# Patient Record
Sex: FEMALE | Race: BLACK OR AFRICAN AMERICAN | NOT HISPANIC OR LATINO
[De-identification: names, ages, dates, MRNs, and addresses within clinical notes are randomized per-mention and may not be internally consistent; named-entity substitution may affect disease eponyms.]

---

## 2017-02-10 ENCOUNTER — RX RENEWAL (OUTPATIENT)
Age: 82
End: 2017-02-10

## 2017-06-05 ENCOUNTER — APPOINTMENT (OUTPATIENT)
Dept: CARDIOLOGY | Facility: CLINIC | Age: 82
End: 2017-06-05

## 2017-06-05 ENCOUNTER — NON-APPOINTMENT (OUTPATIENT)
Age: 82
End: 2017-06-05

## 2017-06-05 VITALS
OXYGEN SATURATION: 100 % | WEIGHT: 163 LBS | HEART RATE: 80 BPM | BODY MASS INDEX: 24.71 KG/M2 | HEIGHT: 68 IN | DIASTOLIC BLOOD PRESSURE: 68 MMHG | SYSTOLIC BLOOD PRESSURE: 110 MMHG

## 2017-08-07 ENCOUNTER — NON-APPOINTMENT (OUTPATIENT)
Age: 82
End: 2017-08-07

## 2017-08-07 ENCOUNTER — APPOINTMENT (OUTPATIENT)
Dept: CARDIOLOGY | Facility: CLINIC | Age: 82
End: 2017-08-07
Payer: MEDICARE

## 2017-08-07 VITALS
WEIGHT: 163 LBS | SYSTOLIC BLOOD PRESSURE: 114 MMHG | BODY MASS INDEX: 24.71 KG/M2 | HEART RATE: 80 BPM | HEIGHT: 68 IN | DIASTOLIC BLOOD PRESSURE: 71 MMHG | OXYGEN SATURATION: 99 %

## 2017-08-07 PROCEDURE — 99215 OFFICE O/P EST HI 40 MIN: CPT

## 2017-08-07 PROCEDURE — 93000 ELECTROCARDIOGRAM COMPLETE: CPT

## 2017-12-04 ENCOUNTER — NON-APPOINTMENT (OUTPATIENT)
Age: 82
End: 2017-12-04

## 2017-12-04 ENCOUNTER — APPOINTMENT (OUTPATIENT)
Dept: CARDIOLOGY | Facility: CLINIC | Age: 82
End: 2017-12-04
Payer: MEDICARE

## 2017-12-04 VITALS — HEART RATE: 96 BPM | DIASTOLIC BLOOD PRESSURE: 78 MMHG | SYSTOLIC BLOOD PRESSURE: 168 MMHG

## 2017-12-04 VITALS — DIASTOLIC BLOOD PRESSURE: 84 MMHG | SYSTOLIC BLOOD PRESSURE: 139 MMHG

## 2017-12-04 VITALS — SYSTOLIC BLOOD PRESSURE: 149 MMHG | DIASTOLIC BLOOD PRESSURE: 82 MMHG

## 2017-12-04 PROCEDURE — 99215 OFFICE O/P EST HI 40 MIN: CPT

## 2017-12-04 PROCEDURE — 93000 ELECTROCARDIOGRAM COMPLETE: CPT

## 2018-03-12 ENCOUNTER — RX RENEWAL (OUTPATIENT)
Age: 83
End: 2018-03-12

## 2018-04-18 ENCOUNTER — NON-APPOINTMENT (OUTPATIENT)
Age: 83
End: 2018-04-18

## 2018-04-18 ENCOUNTER — APPOINTMENT (OUTPATIENT)
Dept: CARDIOLOGY | Facility: CLINIC | Age: 83
End: 2018-04-18
Payer: MEDICARE

## 2018-04-18 VITALS — DIASTOLIC BLOOD PRESSURE: 77 MMHG | SYSTOLIC BLOOD PRESSURE: 151 MMHG

## 2018-04-18 VITALS
HEIGHT: 68 IN | BODY MASS INDEX: 24.55 KG/M2 | HEART RATE: 80 BPM | WEIGHT: 162 LBS | SYSTOLIC BLOOD PRESSURE: 155 MMHG | DIASTOLIC BLOOD PRESSURE: 80 MMHG | OXYGEN SATURATION: 97 %

## 2018-04-18 PROCEDURE — 93000 ELECTROCARDIOGRAM COMPLETE: CPT

## 2018-04-18 PROCEDURE — 99215 OFFICE O/P EST HI 40 MIN: CPT

## 2018-09-09 ENCOUNTER — RX RENEWAL (OUTPATIENT)
Age: 83
End: 2018-09-09

## 2018-09-10 ENCOUNTER — MEDICATION RENEWAL (OUTPATIENT)
Age: 83
End: 2018-09-10

## 2018-09-26 ENCOUNTER — APPOINTMENT (OUTPATIENT)
Dept: CARDIOLOGY | Facility: CLINIC | Age: 83
End: 2018-09-26
Payer: MEDICARE

## 2018-09-26 ENCOUNTER — NON-APPOINTMENT (OUTPATIENT)
Age: 83
End: 2018-09-26

## 2018-09-26 VITALS
DIASTOLIC BLOOD PRESSURE: 86 MMHG | HEART RATE: 80 BPM | OXYGEN SATURATION: 98 % | WEIGHT: 157 LBS | BODY MASS INDEX: 23.79 KG/M2 | SYSTOLIC BLOOD PRESSURE: 144 MMHG | HEIGHT: 68 IN

## 2018-09-26 VITALS — DIASTOLIC BLOOD PRESSURE: 90 MMHG | SYSTOLIC BLOOD PRESSURE: 161 MMHG

## 2018-09-26 PROCEDURE — 93000 ELECTROCARDIOGRAM COMPLETE: CPT

## 2018-09-26 PROCEDURE — 99214 OFFICE O/P EST MOD 30 MIN: CPT

## 2018-11-13 ENCOUNTER — APPOINTMENT (OUTPATIENT)
Dept: OBGYN | Facility: CLINIC | Age: 83
End: 2018-11-13
Payer: MEDICARE

## 2018-11-13 VITALS
SYSTOLIC BLOOD PRESSURE: 150 MMHG | WEIGHT: 152 LBS | BODY MASS INDEX: 23.04 KG/M2 | HEIGHT: 68 IN | DIASTOLIC BLOOD PRESSURE: 88 MMHG

## 2018-11-13 PROCEDURE — 99203 OFFICE O/P NEW LOW 30 MIN: CPT

## 2018-11-21 ENCOUNTER — MESSAGE (OUTPATIENT)
Age: 83
End: 2018-11-21

## 2018-11-29 ENCOUNTER — APPOINTMENT (OUTPATIENT)
Dept: OBGYN | Facility: CLINIC | Age: 83
End: 2018-11-29
Payer: MEDICARE

## 2018-11-29 DIAGNOSIS — N95.0 POSTMENOPAUSAL BLEEDING: ICD-10-CM

## 2018-11-29 PROCEDURE — 58563Z: CUSTOM

## 2018-12-04 ENCOUNTER — MESSAGE (OUTPATIENT)
Age: 83
End: 2018-12-04

## 2018-12-04 DIAGNOSIS — N71.9 INFLAMMATORY DISEASE OF UTERUS, UNSPECIFIED: ICD-10-CM

## 2018-12-04 LAB — CORE LAB BIOPSY: NORMAL

## 2018-12-19 ENCOUNTER — OUTPATIENT (OUTPATIENT)
Dept: OUTPATIENT SERVICES | Facility: HOSPITAL | Age: 83
LOS: 1 days | End: 2018-12-19

## 2018-12-19 ENCOUNTER — APPOINTMENT (OUTPATIENT)
Dept: CV DIAGNOSITCS | Facility: HOSPITAL | Age: 83
End: 2018-12-19
Payer: MEDICARE

## 2018-12-19 DIAGNOSIS — I36.9 NONRHEUMATIC TRICUSPID VALVE DISORDER, UNSPECIFIED: ICD-10-CM

## 2018-12-19 DIAGNOSIS — I05.9 RHEUMATIC MITRAL VALVE DISEASE, UNSPECIFIED: ICD-10-CM

## 2018-12-19 PROCEDURE — 93306 TTE W/DOPPLER COMPLETE: CPT | Mod: 26

## 2019-01-02 ENCOUNTER — APPOINTMENT (OUTPATIENT)
Dept: CARDIOLOGY | Facility: CLINIC | Age: 84
End: 2019-01-02

## 2019-01-16 ENCOUNTER — APPOINTMENT (OUTPATIENT)
Dept: CARDIOLOGY | Facility: CLINIC | Age: 84
End: 2019-01-16
Payer: MEDICARE

## 2019-01-16 ENCOUNTER — NON-APPOINTMENT (OUTPATIENT)
Age: 84
End: 2019-01-16

## 2019-01-16 VITALS
OXYGEN SATURATION: 98 % | DIASTOLIC BLOOD PRESSURE: 82 MMHG | HEART RATE: 81 BPM | WEIGHT: 150 LBS | SYSTOLIC BLOOD PRESSURE: 148 MMHG | BODY MASS INDEX: 22.73 KG/M2 | HEIGHT: 68 IN | RESPIRATION RATE: 14 BRPM

## 2019-01-16 VITALS — DIASTOLIC BLOOD PRESSURE: 80 MMHG | SYSTOLIC BLOOD PRESSURE: 150 MMHG

## 2019-01-16 PROCEDURE — 93000 ELECTROCARDIOGRAM COMPLETE: CPT

## 2019-01-16 PROCEDURE — 99214 OFFICE O/P EST MOD 30 MIN: CPT

## 2019-01-16 RX ORDER — DOXYCYCLINE HYCLATE 100 MG/1
100 TABLET ORAL TWICE DAILY
Qty: 14 | Refills: 0 | Status: DISCONTINUED | COMMUNITY
Start: 2018-12-04 | End: 2019-01-16

## 2019-01-16 NOTE — REASON FOR VISIT
[Follow-Up - Clinic] : a clinic follow-up of [Atrial Fibrillation] : atrial fibrillation [FreeTextEntry1] : tricuspid and mitral disease

## 2019-01-16 NOTE — HISTORY OF PRESENT ILLNESS
[FreeTextEntry1] : anemia. atrial fibrillation remote pacemaker.  hasn’t required a transfusion in a while (3/16)..\par has been a little more dyspneic but not using inhaler as prescribed.\par says some mornings she wakes up with a rapid heart beat. pacemaker changed and winthrop and monitored at [facility].\par \par no acute problems at this time.  chronic anemia requires periodic blood transfusions.\par \par 6/5  no active complaints.. required another transfusions in March..\par \par 12/4/2017  daughter states much better since not taking aspirin.  last transfusion is 7/7/2017.  most recent hgb 11.9 in november. daughters only concern is confusion.\par \par \par 8/7  continues to require transfusions.  two recently.  although patient has not shown evidence of bleeding the patient and daughter do not feel confident taking the aspirin so she stopped a month ago.  she says she feels better since then.  a bone marrow bx is being considered.\par \par 4/18/2018 no recent transfusions since aspirin was stopped.  no dyspnea.  she stopped lasix on her own about 1 month ago.  \par blood work  hgb 11.4/35 plt 159, wbc 4.1\par creat 1.0, nml lfts, \par feels better overall.\par \par 9/26/2018  doing very well overall.  has not required further blood transfusions. is using lasix on prn basis.denies dyspnea or edema.\par \par 1/16/2019  had problem with uterine bleed.  no chest pain. dyspnea or edema.  echo reviewed and appears stable.

## 2019-01-16 NOTE — PHYSICAL EXAM
[General Appearance - Well Developed] : well developed [Normal Appearance] : normal appearance [Well Groomed] : well groomed [General Appearance - Well Nourished] : well nourished [No Deformities] : no deformities [General Appearance - In No Acute Distress] : no acute distress [Normal Conjunctiva] : the conjunctiva exhibited no abnormalities [Eyelids - No Xanthelasma] : the eyelids demonstrated no xanthelasmas [Normal Oral Mucosa] : normal oral mucosa [No Oral Pallor] : no oral pallor [No Oral Cyanosis] : no oral cyanosis [Normal Jugular Venous A Waves Present] : normal jugular venous A waves present [Normal Jugular Venous V Waves Present] : normal jugular venous V waves present [No Jugular Venous Mei A Waves] : no jugular venous mei A waves [Respiration, Rhythm And Depth] : normal respiratory rhythm and effort [Exaggerated Use Of Accessory Muscles For Inspiration] : no accessory muscle use [Auscultation Breath Sounds / Voice Sounds] : lungs were clear to auscultation bilaterally [Normal] : normal [Normal Rate] : normal [Rhythm Regular] : regular [Normal S1] : normal S1 [Normal S2] : normal S2 [No Murmur] : no murmurs heard [Right Carotid Bruit] : no bruit heard over the right carotid [Left Carotid Bruit] : no bruit heard over the left carotid [___+] : [unfilled]U+ pretibial pitting edema on the left [Rt] : no varicose veins of the right leg [Lt] : no varicose veins of the left leg [Abdomen Soft] : soft [Abdomen Tenderness] : non-tender [Abdomen Mass (___ Cm)] : no abdominal mass palpated [Abnormal Walk] : normal gait [Gait - Sufficient For Exercise Testing] : the gait was sufficient for exercise testing [Nail Clubbing] : no clubbing of the fingernails [Cyanosis, Localized] : no localized cyanosis [Petechial Hemorrhages (___cm)] : no petechial hemorrhages [Skin Color & Pigmentation] : normal skin color and pigmentation [] : no rash [No Venous Stasis] : no venous stasis [Skin Lesions] : no skin lesions [No Skin Ulcers] : no skin ulcer [No Xanthoma] : no  xanthoma was observed [Oriented To Time, Place, And Person] : oriented to person, place, and time [Affect] : the affect was normal [Mood] : the mood was normal [No Anxiety] : not feeling anxious

## 2019-01-16 NOTE — DISCUSSION/SUMMARY
[Essential Hypertension] : essential hypertension [Not Responding to Treatment] : not responding to treatment [None] : none [Sodium Restriction] : sodium restriction [Patient] : the patient [Family] : the patient's family [___ Month(s)] : [unfilled] month(s) [FreeTextEntry4] : s/p mitral and tricuspid repair [FreeTextEntry1] : 85 year old with remote mitral surgery, afib, chronic severe anemia requiring transfusion.  has been taken off anticoagulation..  recent ppm replacement at [place].  no active or acute cardio vasc complaints..\par \par 12/4/2017  doing better overall./  mild lower extremity edema.  recommended few dose of low dose diuretic.\par \par 4/18/2018  no active sx.  self stopped lasix.  told her to keep track of edema and use on prn basis.  cannot tolerate aspirin, but no blood transfusions.   states does not need refills.  has maintained nsr.\par \par 9/26/2018  voices no complaints.  no longer transfusion dependant.  not able to tolerate anti coagulation.  BP mildly elevated increase metoprolol to 75 bid.\par \par 1/16/2019  swelling front and side of left shin.calve.  has been that way for a least a month.  obtain Doppler r/o dvtt.  bp still elevated discussed diet and restriction from salt.  echo reviewed.  mild to node TR with mild pulm htn. maintain diuretic,.\par \par

## 2019-03-22 ENCOUNTER — TRANSCRIPTION ENCOUNTER (OUTPATIENT)
Age: 84
End: 2019-03-22

## 2019-04-15 ENCOUNTER — NON-APPOINTMENT (OUTPATIENT)
Age: 84
End: 2019-04-15

## 2019-04-15 ENCOUNTER — APPOINTMENT (OUTPATIENT)
Dept: CARDIOLOGY | Facility: CLINIC | Age: 84
End: 2019-04-15
Payer: MEDICARE

## 2019-04-15 VITALS — DIASTOLIC BLOOD PRESSURE: 74 MMHG | SYSTOLIC BLOOD PRESSURE: 125 MMHG | HEART RATE: 76 BPM | OXYGEN SATURATION: 96 %

## 2019-04-15 PROCEDURE — 99214 OFFICE O/P EST MOD 30 MIN: CPT

## 2019-04-15 PROCEDURE — 93000 ELECTROCARDIOGRAM COMPLETE: CPT

## 2019-04-15 NOTE — PHYSICAL EXAM
[General Appearance - Well Developed] : well developed [Normal Appearance] : normal appearance [Well Groomed] : well groomed [General Appearance - Well Nourished] : well nourished [No Deformities] : no deformities [General Appearance - In No Acute Distress] : no acute distress [Normal Conjunctiva] : the conjunctiva exhibited no abnormalities [Eyelids - No Xanthelasma] : the eyelids demonstrated no xanthelasmas [Normal Oral Mucosa] : normal oral mucosa [No Oral Pallor] : no oral pallor [No Oral Cyanosis] : no oral cyanosis [Normal Jugular Venous A Waves Present] : normal jugular venous A waves present [Normal Jugular Venous V Waves Present] : normal jugular venous V waves present [No Jugular Venous Mei A Waves] : no jugular venous mei A waves [Respiration, Rhythm And Depth] : normal respiratory rhythm and effort [Exaggerated Use Of Accessory Muscles For Inspiration] : no accessory muscle use [Auscultation Breath Sounds / Voice Sounds] : lungs were clear to auscultation bilaterally [Normal] : normal [Normal Rate] : normal [Rhythm Regular] : regular [Normal S1] : normal S1 [Normal S2] : normal S2 [No Murmur] : no murmurs heard [Right Carotid Bruit] : no bruit heard over the right carotid [Left Carotid Bruit] : no bruit heard over the left carotid [___ +] : bilateral [unfilled]U+ pitting edema to the ankles [Rt] : no varicose veins of the right leg [Lt] : no varicose veins of the left leg [Abdomen Soft] : soft [Abdomen Tenderness] : non-tender [Abdomen Mass (___ Cm)] : no abdominal mass palpated [Abnormal Walk] : normal gait [Gait - Sufficient For Exercise Testing] : the gait was sufficient for exercise testing [Nail Clubbing] : no clubbing of the fingernails [Cyanosis, Localized] : no localized cyanosis [Petechial Hemorrhages (___cm)] : no petechial hemorrhages [Skin Color & Pigmentation] : normal skin color and pigmentation [] : no rash [No Venous Stasis] : no venous stasis [Skin Lesions] : no skin lesions [No Skin Ulcers] : no skin ulcer [No Xanthoma] : no  xanthoma was observed [Oriented To Time, Place, And Person] : oriented to person, place, and time [Affect] : the affect was normal [Mood] : the mood was normal [No Anxiety] : not feeling anxious

## 2019-04-15 NOTE — REASON FOR VISIT
[Follow-Up - Clinic] : a clinic follow-up of [Atrial Fibrillation] : atrial fibrillation [Hypertension] : hypertension [Family Member] : family member

## 2019-04-15 NOTE — HISTORY OF PRESENT ILLNESS
[FreeTextEntry1] : anemia. atrial fibrillation remote pacemaker.  hasn’t required a transfusion in a while (3/16)..\par has been a little more dyspneic but not using inhaler as prescribed.\par says some mornings she wakes up with a rapid heart beat. pacemaker changed and winthrop and monitored at [facility].\par \par no acute problems at this time.  chronic anemia requires periodic blood transfusions.\par \par 6/5  no active complaints.. required another transfusions in March..\par \par 12/4/2017  daughter states much better since not taking aspirin.  last transfusion is 7/7/2017.  most recent hgb 11.9 in november. daughters only concern is confusion.\par \par \par 8/7  continues to require transfusions.  two recently.  although patient has not shown evidence of bleeding the patient and daughter do not feel confident taking the aspirin so she stopped a month ago.  she says she feels better since then.  a bone marrow bx is being considered.\par \par 4/18/2018 no recent transfusions since aspirin was stopped.  no dyspnea.  she stopped lasix on her own about 1 month ago.  \par blood work  hgb 11.4/35 plt 159, wbc 4.1\par creat 1.0, nml lfts, \par feels better overall.\par \par 9/26/2018  doing very well overall.  has not required further blood transfusions. is using lasix on prn basis.denies dyspnea or edema.\par \par 1/16/2019  had problem with uterine bleed.  no chest pain. dyspnea or edema.  echo reviewed and appears stable.\par \par 4/15/2019  no new issues or complaints.  stopped  using diuretic because of incontinence.

## 2019-04-15 NOTE — DISCUSSION/SUMMARY
[Hyperlipidemia] : hyperlipidemia [Stable] : stable [Essential Hypertension] : essential hypertension [Improving] : improving [___ Month(s)] : [unfilled] month(s) [FreeTextEntry1] : 85 year old with remote mitral surgery, afib, chronic severe anemia requiring transfusion.  has been taken off anticoagulation..  recent ppm replacement at [place].  no active or acute cardio vasc complaints..\par \par 12/4/2017  doing better overall./  mild lower extremity edema.  recommended few dose of low dose diuretic.\par \par 4/18/2018  no active sx.  self stopped lasix.  told her to keep track of edema and use on prn basis.  cannot tolerate aspirin, but no blood transfusions.   states does not need refills.  has maintained nsr.\par \par 9/26/2018  voices no complaints.  no longer transfusion dependant.  not able to tolerate anti coagulation.  BP mildly elevated increase metoprolol to 75 bid.\par \par 1/16/2019  swelling front and side of left shin.calve.  has been that way for a least a month.  obtain Doppler r/o dvtt.  bp still elevated discussed diet and restriction from salt.  echo reviewed.  mild to node TR with mild pulm htn. maintain diuretic,.\par \par 4/15/2019  negotiated taking Lasix every two or three days for edema.  BP is well controlled.  no DVT on Doppler scan.\par \par

## 2019-08-05 ENCOUNTER — NON-APPOINTMENT (OUTPATIENT)
Age: 84
End: 2019-08-05

## 2019-08-05 ENCOUNTER — APPOINTMENT (OUTPATIENT)
Dept: CARDIOLOGY | Facility: CLINIC | Age: 84
End: 2019-08-05
Payer: MEDICARE

## 2019-08-05 VITALS — SYSTOLIC BLOOD PRESSURE: 126 MMHG | DIASTOLIC BLOOD PRESSURE: 78 MMHG

## 2019-08-05 VITALS
SYSTOLIC BLOOD PRESSURE: 138 MMHG | HEIGHT: 68 IN | HEART RATE: 89 BPM | WEIGHT: 152 LBS | OXYGEN SATURATION: 98 % | BODY MASS INDEX: 23.04 KG/M2 | DIASTOLIC BLOOD PRESSURE: 84 MMHG

## 2019-08-05 PROCEDURE — 99214 OFFICE O/P EST MOD 30 MIN: CPT

## 2019-08-05 PROCEDURE — 93000 ELECTROCARDIOGRAM COMPLETE: CPT

## 2019-08-05 NOTE — HISTORY OF PRESENT ILLNESS
[FreeTextEntry1] : anemia. atrial fibrillation remote pacemaker.  hasn’t required a transfusion in a while (3/16)..\par has been a little more dyspneic but not using inhaler as prescribed.\par says some mornings she wakes up with a rapid heart beat. pacemaker changed and winthrop and monitored at [facility].\par \par no acute problems at this time.  chronic anemia requires periodic blood transfusions.\par \par 6/5  no active complaints.. required another transfusions in March..\par \par 12/4/2017  daughter states much better since not taking aspirin.  last transfusion is 7/7/2017.  most recent hgb 11.9 in november. daughters only concern is confusion.\par \par \par 8/7  continues to require transfusions.  two recently.  although patient has not shown evidence of bleeding the patient and daughter do not feel confident taking the aspirin so she stopped a month ago.  she says she feels better since then.  a bone marrow bx is being considered.\par \par 4/18/2018 no recent transfusions since aspirin was stopped.  no dyspnea.  she stopped lasix on her own about 1 month ago.  \par blood work  hgb 11.4/35 plt 159, wbc 4.1\par creat 1.0, nml lfts, \par feels better overall.\par \par 9/26/2018  doing very well overall.  has not required further blood transfusions. is using lasix on prn basis.denies dyspnea or edema.\par \par 1/16/2019  had problem with uterine bleed.  no chest pain. dyspnea or edema.  echo reviewed and appears stable.\par \par 4/15/2019  no new issues or complaints.  stopped  using diuretic because of incontinence.\par \par 8/5/2019  pacemaker check was good.  no chest pain,  no water retention.  progressive memory.  eating well.

## 2019-08-05 NOTE — DISCUSSION/SUMMARY
[Atrial Fibrillation] : atrial fibrillation [Stable] : stable [Essential Hypertension] : essential hypertension [___ Month(s)] : [unfilled] month(s) [FreeTextEntry4] : mvr//tvr [FreeTextEntry1] : 85 year old with remote mitral surgery, afib, chronic severe anemia requiring transfusion.  has been taken off anticoagulation..  recent ppm replacement at [place].  no active or acute cardio vasc complaints..\par \par 12/4/2017  doing better overall./  mild lower extremity edema.  recommended few dose of low dose diuretic.\par \par 4/18/2018  no active sx.  self stopped lasix.  told her to keep track of edema and use on prn basis.  cannot tolerate aspirin, but no blood transfusions.   states does not need refills.  has maintained nsr.\par \par 9/26/2018  voices no complaints.  no longer transfusion dependant.  not able to tolerate anti coagulation.  BP mildly elevated increase metoprolol to 75 bid.\par \par 1/16/2019  swelling front and side of left shin.calve.  has been that way for a least a month.  obtain Doppler r/o dvtt.  bp still elevated discussed diet and restriction from salt.  echo reviewed.  mild to node TR with mild pulm htn. maintain diuretic,.\par \par 8/5/2019  no acute sx.. bp improved and no peripheral edema..  maintain current med tx.\par \par

## 2019-08-05 NOTE — REASON FOR VISIT
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

## 2019-08-05 NOTE — PHYSICAL EXAM
[General Appearance - Well Developed] : well developed [Normal Appearance] : normal appearance [Well Groomed] : well groomed [General Appearance - Well Nourished] : well nourished [No Deformities] : no deformities [General Appearance - In No Acute Distress] : no acute distress [Eyelids - No Xanthelasma] : the eyelids demonstrated no xanthelasmas [Normal Conjunctiva] : the conjunctiva exhibited no abnormalities [Normal Oral Mucosa] : normal oral mucosa [No Oral Pallor] : no oral pallor [No Oral Cyanosis] : no oral cyanosis [Normal Jugular Venous V Waves Present] : normal jugular venous V waves present [Normal Jugular Venous A Waves Present] : normal jugular venous A waves present [No Jugular Venous Mei A Waves] : no jugular venous mei A waves [Respiration, Rhythm And Depth] : normal respiratory rhythm and effort [Auscultation Breath Sounds / Voice Sounds] : lungs were clear to auscultation bilaterally [Exaggerated Use Of Accessory Muscles For Inspiration] : no accessory muscle use [Normal Rate] : normal [Normal] : normal [Rhythm Regular] : regular [Normal S2] : normal S2 [No Murmur] : no murmurs heard [Right Carotid Bruit] : no bruit heard over the right carotid [Left Carotid Bruit] : no bruit heard over the left carotid [No Abnormalities] : the abdominal aorta was not enlarged and no bruit was heard [Bruit] : no bruit heard [No Pitting Edema] : no pitting edema present [Abdomen Soft] : soft [Abdomen Tenderness] : non-tender [Abdomen Mass (___ Cm)] : no abdominal mass palpated [Gait - Sufficient For Exercise Testing] : the gait was sufficient for exercise testing [Abnormal Walk] : normal gait [Nail Clubbing] : no clubbing of the fingernails [Cyanosis, Localized] : no localized cyanosis [Petechial Hemorrhages (___cm)] : no petechial hemorrhages [Skin Color & Pigmentation] : normal skin color and pigmentation [] : no rash [No Venous Stasis] : no venous stasis [Skin Lesions] : no skin lesions [No Skin Ulcers] : no skin ulcer [No Xanthoma] : no  xanthoma was observed [Oriented To Time, Place, And Person] : oriented to person, place, and time [Affect] : the affect was normal [Mood] : the mood was normal [No Anxiety] : not feeling anxious

## 2019-09-23 ENCOUNTER — RX RENEWAL (OUTPATIENT)
Age: 84
End: 2019-09-23

## 2019-10-06 ENCOUNTER — RX RENEWAL (OUTPATIENT)
Age: 84
End: 2019-10-06

## 2019-11-25 ENCOUNTER — RX RENEWAL (OUTPATIENT)
Age: 84
End: 2019-11-25

## 2019-12-09 ENCOUNTER — RX RENEWAL (OUTPATIENT)
Age: 84
End: 2019-12-09

## 2019-12-18 ENCOUNTER — APPOINTMENT (OUTPATIENT)
Dept: CARDIOLOGY | Facility: CLINIC | Age: 84
End: 2019-12-18
Payer: MEDICARE

## 2019-12-18 ENCOUNTER — NON-APPOINTMENT (OUTPATIENT)
Age: 84
End: 2019-12-18

## 2019-12-18 VITALS
HEART RATE: 81 BPM | BODY MASS INDEX: 22.88 KG/M2 | OXYGEN SATURATION: 96 % | DIASTOLIC BLOOD PRESSURE: 71 MMHG | HEIGHT: 68 IN | WEIGHT: 151 LBS | SYSTOLIC BLOOD PRESSURE: 123 MMHG

## 2019-12-18 PROCEDURE — 93000 ELECTROCARDIOGRAM COMPLETE: CPT

## 2019-12-18 PROCEDURE — 99214 OFFICE O/P EST MOD 30 MIN: CPT

## 2019-12-18 NOTE — HISTORY OF PRESENT ILLNESS
[FreeTextEntry1] : anemia. atrial fibrillation remote pacemaker.  hasn’t required a transfusion in a while (3/16)..\par has been a little more dyspneic but not using inhaler as prescribed.\par says some mornings she wakes up with a rapid heart beat. pacemaker changed and winthrop and monitored at [facility].\par \par no acute problems at this time.  chronic anemia requires periodic blood transfusions.\par \par 6/5  no active complaints.. required another transfusions in March..\par \par 12/4/2017  daughter states much better since not taking aspirin.  last transfusion is 7/7/2017.  most recent hgb 11.9 in november. daughters only concern is confusion.\par \par \par 8/7  continues to require transfusions.  two recently.  although patient has not shown evidence of bleeding the patient and daughter do not feel confident taking the aspirin so she stopped a month ago.  she says she feels better since then.  a bone marrow bx is being considered.\par \par 4/18/2018 no recent transfusions since aspirin was stopped.  no dyspnea.  she stopped lasix on her own about 1 month ago.  \par blood work  hgb 11.4/35 plt 159, wbc 4.1\par creat 1.0, nml lfts, \par feels better overall.\par \par 9/26/2018  doing very well overall.  has not required further blood transfusions. is using lasix on prn basis.denies dyspnea or edema.\par \par 1/16/2019  had problem with uterine bleed.  no chest pain. dyspnea or edema.  echo reviewed and appears stable.\par \par 4/15/2019  no new issues or complaints.  stopped  using diuretic because of incontinence.\par \par 8/5/2019  pacemaker check was good.  no chest pain,  no water retention.  progressive memory.  eating well.\par \par 12/18/2019  complains of frequent urination.  not on a diuretic. daughter says not diabetic.  no edema. no dyspnea,.

## 2019-12-18 NOTE — DISCUSSION/SUMMARY
[Atrial Fibrillation] : atrial fibrillation [Stable] : stable [Essential Hypertension] : essential hypertension [Improving] : improving [None] : none [___ Month(s)] : [unfilled] month(s) [FreeTextEntry1] : 85 year old with remote mitral surgery, afib, chronic severe anemia requiring transfusion.  has been taken off anticoagulation..  recent ppm replacement at [place].  no active or acute cardio vasc complaints..\par \par 12/4/2017  doing better overall./  mild lower extremity edema.  recommended few dose of low dose diuretic.\par \par 4/18/2018  no active sx.  self stopped lasix.  told her to keep track of edema and use on prn basis.  cannot tolerate aspirin, but no blood transfusions.   states does not need refills.  has maintained nsr.\par \par 9/26/2018  voices no complaints.  no longer transfusion dependant.  not able to tolerate anti coagulation.  BP mildly elevated increase metoprolol to 75 bid.\par \par 1/16/2019  swelling front and side of left shin.calve.  has been that way for a least a month.  obtain Doppler r/o dvtt.  bp still elevated discussed diet and restriction from salt.  echo reviewed.  mild to node TR with mild pulm htn. maintain diuretic,.\par \par 8/5/2019  no acute sx.. bp improved and no peripheral edema..  maintain current med tx.\par \par 12/18/2019  no edema. not on a diuretic, compllains of excessive urination.  suggest they see PCP to r/o UTI, urinary retention.\par \par

## 2019-12-18 NOTE — PHYSICAL EXAM
[General Appearance - Well Developed] : well developed [Normal Appearance] : normal appearance [Well Groomed] : well groomed [General Appearance - Well Nourished] : well nourished [No Deformities] : no deformities [General Appearance - In No Acute Distress] : no acute distress [Eyelids - No Xanthelasma] : the eyelids demonstrated no xanthelasmas [Normal Conjunctiva] : the conjunctiva exhibited no abnormalities [Normal Oral Mucosa] : normal oral mucosa [No Oral Pallor] : no oral pallor [No Oral Cyanosis] : no oral cyanosis [Normal Jugular Venous A Waves Present] : normal jugular venous A waves present [Normal Jugular Venous V Waves Present] : normal jugular venous V waves present [No Jugular Venous Mei A Waves] : no jugular venous mei A waves [Respiration, Rhythm And Depth] : normal respiratory rhythm and effort [Exaggerated Use Of Accessory Muscles For Inspiration] : no accessory muscle use [Auscultation Breath Sounds / Voice Sounds] : lungs were clear to auscultation bilaterally [Normal] : normal [Normal Rate] : normal [Normal S1] : normal S1 [Rhythm Regular] : regular [Normal S2] : normal S2 [Left Carotid Bruit] : no bruit heard over the left carotid [No Murmur] : no murmurs heard [Right Carotid Bruit] : no bruit heard over the right carotid [No Abnormalities] : the abdominal aorta was not enlarged and no bruit was heard [Bruit] : no bruit heard [No Pitting Edema] : no pitting edema present [Rt] : no varicose veins of the right leg [Lt] : no varicose veins of the left leg [Abdomen Soft] : soft [Abdomen Tenderness] : non-tender [Abdomen Mass (___ Cm)] : no abdominal mass palpated [Abnormal Walk] : normal gait [Gait - Sufficient For Exercise Testing] : the gait was sufficient for exercise testing [Nail Clubbing] : no clubbing of the fingernails [Cyanosis, Localized] : no localized cyanosis [Petechial Hemorrhages (___cm)] : no petechial hemorrhages [] : no rash [Skin Color & Pigmentation] : normal skin color and pigmentation [No Venous Stasis] : no venous stasis [Skin Lesions] : no skin lesions [No Skin Ulcers] : no skin ulcer [No Xanthoma] : no  xanthoma was observed [Oriented To Time, Place, And Person] : oriented to person, place, and time [Affect] : the affect was normal [Mood] : the mood was normal [No Anxiety] : not feeling anxious

## 2019-12-18 NOTE — REASON FOR VISIT
[Follow-Up - Clinic] : a clinic follow-up of [FreeTextEntry1] : tricuspid  valve disease [Atrial Fibrillation] : atrial fibrillation [Family Member] : family member

## 2020-06-17 ENCOUNTER — NON-APPOINTMENT (OUTPATIENT)
Age: 85
End: 2020-06-17

## 2020-06-17 ENCOUNTER — APPOINTMENT (OUTPATIENT)
Dept: CARDIOLOGY | Facility: CLINIC | Age: 85
End: 2020-06-17
Payer: MEDICARE

## 2020-06-17 VITALS
WEIGHT: 155 LBS | BODY MASS INDEX: 24.33 KG/M2 | SYSTOLIC BLOOD PRESSURE: 148 MMHG | HEIGHT: 67 IN | DIASTOLIC BLOOD PRESSURE: 81 MMHG | OXYGEN SATURATION: 97 % | RESPIRATION RATE: 18 BRPM | TEMPERATURE: 98 F | HEART RATE: 80 BPM

## 2020-06-17 PROCEDURE — 93000 ELECTROCARDIOGRAM COMPLETE: CPT

## 2020-06-17 PROCEDURE — 99214 OFFICE O/P EST MOD 30 MIN: CPT

## 2020-06-17 NOTE — REASON FOR VISIT
[Follow-Up - Clinic] : a clinic follow-up of [Atrial Fibrillation] : atrial fibrillation [Hyperlipidemia] : hyperlipidemia [Hypertension] : hypertension [Family Member] : family member

## 2020-06-17 NOTE — PHYSICAL EXAM
[General Appearance - Well Developed] : well developed [Well Groomed] : well groomed [Normal Appearance] : normal appearance [General Appearance - Well Nourished] : well nourished [No Deformities] : no deformities [General Appearance - In No Acute Distress] : no acute distress [Normal Conjunctiva] : the conjunctiva exhibited no abnormalities [Eyelids - No Xanthelasma] : the eyelids demonstrated no xanthelasmas [No Oral Pallor] : no oral pallor [Normal Oral Mucosa] : normal oral mucosa [No Oral Cyanosis] : no oral cyanosis [Normal Jugular Venous A Waves Present] : normal jugular venous A waves present [Normal Jugular Venous V Waves Present] : normal jugular venous V waves present [No Jugular Venous Mei A Waves] : no jugular venous mei A waves [Respiration, Rhythm And Depth] : normal respiratory rhythm and effort [Exaggerated Use Of Accessory Muscles For Inspiration] : no accessory muscle use [Auscultation Breath Sounds / Voice Sounds] : lungs were clear to auscultation bilaterally [Rhythm Regular] : regular [Normal Rate] : normal [Normal] : normal [Normal S2] : normal S2 [Normal S1] : normal S1 [No Murmur] : no murmurs heard [Left Carotid Bruit] : no bruit heard over the left carotid [Right Carotid Bruit] : no bruit heard over the right carotid [Rt] : no varicose veins of the right leg [___ +] : bilateral [unfilled]U+ pitting edema to the ankles [Lt] : no varicose veins of the left leg [Abdomen Soft] : soft [Abdomen Tenderness] : non-tender [Abnormal Walk] : normal gait [Abdomen Mass (___ Cm)] : no abdominal mass palpated [Gait - Sufficient For Exercise Testing] : the gait was sufficient for exercise testing [Nail Clubbing] : no clubbing of the fingernails [Petechial Hemorrhages (___cm)] : no petechial hemorrhages [Cyanosis, Localized] : no localized cyanosis [Skin Color & Pigmentation] : normal skin color and pigmentation [Skin Lesions] : no skin lesions [No Venous Stasis] : no venous stasis [] : no rash [No Skin Ulcers] : no skin ulcer [No Xanthoma] : no  xanthoma was observed [Affect] : the affect was normal [Oriented To Time, Place, And Person] : oriented to person, place, and time [Mood] : the mood was normal [No Anxiety] : not feeling anxious

## 2020-06-17 NOTE — HISTORY OF PRESENT ILLNESS
[FreeTextEntry1] : anemia. atrial fibrillation remote pacemaker.  hasn’t required a transfusion in a while (3/16)..\par has been a little more dyspneic but not using inhaler as prescribed.\par says some mornings she wakes up with a rapid heart beat. pacemaker changed and winthrop and monitored at [facility].\par \par no acute problems at this time.  chronic anemia requires periodic blood transfusions.\par \par 6/5  no active complaints.. required another transfusions in March..\par \par 12/4/2017  daughter states much better since not taking aspirin.  last transfusion is 7/7/2017.  most recent hgb 11.9 in november. daughters only concern is confusion.\par \par \par 8/7  continues to require transfusions.  two recently.  although patient has not shown evidence of bleeding the patient and daughter do not feel confident taking the aspirin so she stopped a month ago.  she says she feels better since then.  a bone marrow bx is being considered.\par \par 4/18/2018 no recent transfusions since aspirin was stopped.  no dyspnea.  she stopped lasix on her own about 1 month ago.  \par blood work  hgb 11.4/35 plt 159, wbc 4.1\par creat 1.0, nml lfts, \par feels better overall.\par \par 9/26/2018  doing very well overall.  has not required further blood transfusions. is using lasix on prn basis.denies dyspnea or edema.\par \par 1/16/2019  had problem with uterine bleed.  no chest pain. dyspnea or edema.  echo reviewed and appears stable.\par \par 4/15/2019  no new issues or complaints.  stopped  using diuretic because of incontinence.\par \par 8/5/2019  pacemaker check was good.  no chest pain,  no water retention.  progressive memory.  eating well.\par \par 12/18/2019  complains of frequent urination.  not on a diuretic. daughter says not diabetic.  no edema. no dyspnea,.\par \par 6/17/2020  no chest pain. dyspnea, min edema..  progressive memory and confusion issues.

## 2020-10-23 ENCOUNTER — RX RENEWAL (OUTPATIENT)
Age: 85
End: 2020-10-23

## 2020-11-06 ENCOUNTER — NON-APPOINTMENT (OUTPATIENT)
Age: 85
End: 2020-11-06

## 2020-11-06 ENCOUNTER — APPOINTMENT (OUTPATIENT)
Dept: CARDIOLOGY | Facility: CLINIC | Age: 85
End: 2020-11-06
Payer: MEDICARE

## 2020-11-06 VITALS — HEART RATE: 80 BPM | OXYGEN SATURATION: 96 % | DIASTOLIC BLOOD PRESSURE: 75 MMHG | SYSTOLIC BLOOD PRESSURE: 120 MMHG

## 2020-11-06 VITALS
HEART RATE: 80 BPM | HEIGHT: 67 IN | BODY MASS INDEX: 24.33 KG/M2 | DIASTOLIC BLOOD PRESSURE: 75 MMHG | WEIGHT: 155 LBS | RESPIRATION RATE: 12 BRPM | SYSTOLIC BLOOD PRESSURE: 120 MMHG

## 2020-11-06 DIAGNOSIS — I05.9 RHEUMATIC MITRAL VALVE DISEASE, UNSPECIFIED: ICD-10-CM

## 2020-11-06 PROCEDURE — 99214 OFFICE O/P EST MOD 30 MIN: CPT

## 2020-11-06 PROCEDURE — 93000 ELECTROCARDIOGRAM COMPLETE: CPT

## 2020-11-06 NOTE — DISCUSSION/SUMMARY
[___ Month(s)] : [unfilled] month(s) [FreeTextEntry1] : 85 year old with remote mitral surgery, afib, chronic severe anemia requiring transfusion.  has been taken off anticoagulation..  recent ppm replacement at [place].  no active or acute cardio vasc complaints..\par \par 12/4/2017  doing better overall./  mild lower extremity edema.  recommended few dose of low dose diuretic.\par \par 4/18/2018  no active sx.  self stopped lasix.  told her to keep track of edema and use on prn basis.  cannot tolerate aspirin, but no blood transfusions.   states does not need refills.  has maintained nsr.\par \par 9/26/2018  voices no complaints.  no longer transfusion dependant.  not able to tolerate anti coagulation.  BP mildly elevated increase metoprolol to 75 bid.\par \par 1/16/2019  swelling front and side of left shin.calve.  has been that way for a least a month.  obtain Doppler r/o dvtt.  bp still elevated discussed diet and restriction from salt.  echo reviewed.  mild to node TR with mild pulm htn. maintain diuretic,.\par \par 8/5/2019  no acute sx.. bp improved and no peripheral edema..  maintain current med tx.\par \par 11/06/2020  no acute cardiac sx.  sx are limited by confusion.  BP is well controlled.  cannot tolerate anti coagulation,.\par \par

## 2020-11-06 NOTE — HISTORY OF PRESENT ILLNESS
[FreeTextEntry1] : anemia. atrial fibrillation remote pacemaker.  hasn’t required a transfusion in a while (3/16)..\par has been a little more dyspneic but not using inhaler as prescribed.\par says some mornings she wakes up with a rapid heart beat. pacemaker changed and winthrop and monitored at [facility].\par \par no acute problems at this time.  chronic anemia requires periodic blood transfusions.\par \par 6/5  no active complaints.. required another transfusions in March..\par \par 12/4/2017  daughter states much better since not taking aspirin.  last transfusion is 7/7/2017.  most recent hgb 11.9 in november. daughters only concern is confusion.\par \par \par 8/7  continues to require transfusions.  two recently.  although patient has not shown evidence of bleeding the patient and daughter do not feel confident taking the aspirin so she stopped a month ago.  she says she feels better since then.  a bone marrow bx is being considered.\par \par 4/18/2018 no recent transfusions since aspirin was stopped.  no dyspnea.  she stopped lasix on her own about 1 month ago.  \par blood work  hgb 11.4/35 plt 159, wbc 4.1\par creat 1.0, nml lfts, \par feels better overall.\par \par 9/26/2018  doing very well overall.  has not required further blood transfusions. is using lasix on prn basis.denies dyspnea or edema.\par \par 1/16/2019  had problem with uterine bleed.  no chest pain. dyspnea or edema.  echo reviewed and appears stable.\par \par 4/15/2019  no new issues or complaints.  stopped  using diuretic because of incontinence.\par \par 8/5/2019  pacemaker check was good.  no chest pain,  no water retention.  progressive memory.  eating well.\par \par 12/18/2019  complains of frequent urination.  not on a diuretic. daughter says not diabetic.  no edema. no dyspnea,.\par \par 6/17/2020  no chest pain. dyspnea, min edema..  progressive memory and confusion issues. \par \par 11/06/2020  no chest pain, dyspnea or edema. confusion .  takes zetia, folic acid occasionally.

## 2020-11-06 NOTE — PHYSICAL EXAM
[General Appearance - Well Developed] : well developed [Normal Appearance] : normal appearance [Well Groomed] : well groomed [General Appearance - Well Nourished] : well nourished [No Deformities] : no deformities [General Appearance - In No Acute Distress] : no acute distress [Normal Conjunctiva] : the conjunctiva exhibited no abnormalities [Eyelids - No Xanthelasma] : the eyelids demonstrated no xanthelasmas [Normal Oral Mucosa] : normal oral mucosa [No Oral Pallor] : no oral pallor [No Oral Cyanosis] : no oral cyanosis [Normal Jugular Venous A Waves Present] : normal jugular venous A waves present [Normal Jugular Venous V Waves Present] : normal jugular venous V waves present [No Jugular Venous Mei A Waves] : no jugular venous mei A waves [Respiration, Rhythm And Depth] : normal respiratory rhythm and effort [Exaggerated Use Of Accessory Muscles For Inspiration] : no accessory muscle use [Auscultation Breath Sounds / Voice Sounds] : lungs were clear to auscultation bilaterally [Normal] : normal [Normal Rate] : normal [Rhythm Regular] : regular [Normal S1] : normal S1 [Normal S2] : normal S2 [I] : a grade 1 [Right Carotid Bruit] : no bruit heard over the right carotid [Left Carotid Bruit] : no bruit heard over the left carotid [No Abnormalities] : the abdominal aorta was not enlarged and no bruit was heard [Bruit] : no bruit heard [No Pitting Edema] : no pitting edema present [Rt] : no varicose veins of the right leg [Lt] : no varicose veins of the left leg [Abdomen Soft] : soft [Abdomen Tenderness] : non-tender [Abdomen Mass (___ Cm)] : no abdominal mass palpated [Abnormal Walk] : normal gait [Gait - Sufficient For Exercise Testing] : the gait was sufficient for exercise testing [Nail Clubbing] : no clubbing of the fingernails [Cyanosis, Localized] : no localized cyanosis [Petechial Hemorrhages (___cm)] : no petechial hemorrhages [Skin Color & Pigmentation] : normal skin color and pigmentation [] : no rash [No Venous Stasis] : no venous stasis [Skin Lesions] : no skin lesions [No Skin Ulcers] : no skin ulcer [No Xanthoma] : no  xanthoma was observed [Oriented To Time, Place, And Person] : oriented to person, place, and time [Affect] : the affect was normal [Mood] : the mood was normal [No Anxiety] : not feeling anxious

## 2020-11-16 ENCOUNTER — RX RENEWAL (OUTPATIENT)
Age: 85
End: 2020-11-16

## 2020-12-16 ENCOUNTER — APPOINTMENT (OUTPATIENT)
Dept: CARDIOLOGY | Facility: CLINIC | Age: 85
End: 2020-12-16

## 2021-03-08 ENCOUNTER — NON-APPOINTMENT (OUTPATIENT)
Age: 86
End: 2021-03-08

## 2021-03-08 ENCOUNTER — APPOINTMENT (OUTPATIENT)
Dept: CARDIOLOGY | Facility: CLINIC | Age: 86
End: 2021-03-08
Payer: MEDICARE

## 2021-03-08 VITALS
SYSTOLIC BLOOD PRESSURE: 133 MMHG | OXYGEN SATURATION: 99 % | WEIGHT: 158 LBS | HEIGHT: 67 IN | HEART RATE: 80 BPM | BODY MASS INDEX: 24.8 KG/M2 | DIASTOLIC BLOOD PRESSURE: 77 MMHG

## 2021-03-08 DIAGNOSIS — E78.5 HYPERLIPIDEMIA, UNSPECIFIED: ICD-10-CM

## 2021-03-08 PROCEDURE — 99214 OFFICE O/P EST MOD 30 MIN: CPT

## 2021-03-08 PROCEDURE — 93000 ELECTROCARDIOGRAM COMPLETE: CPT

## 2021-03-08 NOTE — PHYSICAL EXAM
[General Appearance - Well Developed] : well developed [Normal Appearance] : normal appearance [Well Groomed] : well groomed [General Appearance - Well Nourished] : well nourished [No Deformities] : no deformities [General Appearance - In No Acute Distress] : no acute distress [Normal Conjunctiva] : the conjunctiva exhibited no abnormalities [Eyelids - No Xanthelasma] : the eyelids demonstrated no xanthelasmas [Normal Oral Mucosa] : normal oral mucosa [No Oral Pallor] : no oral pallor [No Oral Cyanosis] : no oral cyanosis [Normal Jugular Venous A Waves Present] : normal jugular venous A waves present [Normal Jugular Venous V Waves Present] : normal jugular venous V waves present [No Jugular Venous Mei A Waves] : no jugular venous mei A waves [Respiration, Rhythm And Depth] : normal respiratory rhythm and effort [Exaggerated Use Of Accessory Muscles For Inspiration] : no accessory muscle use [Auscultation Breath Sounds / Voice Sounds] : lungs were clear to auscultation bilaterally [Normal] : normal [Normal Rate] : normal [Irregularly Irregular] : irregularly irregular [Normal S1] : normal S1 [Normal S2] : normal S2 [I] : a grade 1 [Right Carotid Bruit] : no bruit heard over the right carotid [Left Carotid Bruit] : no bruit heard over the left carotid [No Abnormalities] : the abdominal aorta was not enlarged and no bruit was heard [___ +] : bilateral [unfilled]U+ pitting edema to the ankles [Rt] : no varicose veins of the right leg [Lt] : no varicose veins of the left leg [Abdomen Soft] : soft [Abdomen Tenderness] : non-tender [Abdomen Mass (___ Cm)] : no abdominal mass palpated [Abnormal Walk] : normal gait [Gait - Sufficient For Exercise Testing] : the gait was sufficient for exercise testing [Nail Clubbing] : no clubbing of the fingernails [Cyanosis, Localized] : no localized cyanosis [Petechial Hemorrhages (___cm)] : no petechial hemorrhages [Skin Color & Pigmentation] : normal skin color and pigmentation [] : no rash [No Venous Stasis] : no venous stasis [Skin Lesions] : no skin lesions [No Skin Ulcers] : no skin ulcer [No Xanthoma] : no  xanthoma was observed [Oriented To Time, Place, And Person] : oriented to person, place, and time [Affect] : the affect was normal [Mood] : the mood was normal [No Anxiety] : not feeling anxious

## 2021-03-08 NOTE — DISCUSSION/SUMMARY
[Atrial Fibrillation] : atrial fibrillation [Stable] : stable [Essential Hypertension] : essential hypertension [Improving] : improving [None] : none [___ Month(s)] : [unfilled] month(s) [FreeTextEntry1] : 85 year old with remote mitral surgery, afib, chronic severe anemia requiring transfusion.  has been taken off anticoagulation..  recent ppm replacement at [place].  no active or acute cardio vasc complaints..\par \par 12/4/2017  doing better overall./  mild lower extremity edema.  recommended few dose of low dose diuretic.\par \par 4/18/2018  no active sx.  self stopped lasix.  told her to keep track of edema and use on prn basis.  cannot tolerate aspirin, but no blood transfusions.   states does not need refills.  has maintained nsr.\par \par 9/26/2018  voices no complaints.  no longer transfusion dependant.  not able to tolerate anti coagulation.  BP mildly elevated increase metoprolol to 75 bid.\par \par 1/16/2019  swelling front and side of left shin.calve.  has been that way for a least a month.  obtain Doppler r/o dvtt.  bp still elevated discussed diet and restriction from salt.  echo reviewed.  mild to node TR with mild pulm htn. maintain diuretic,.\par \par 8/5/2019  no acute sx.. bp improved and no peripheral edema..  maintain current med tx.\par \par \par 3/8/2021  developed [employer],  Bp is wll controlled, trace edema.  no med changes..\par

## 2021-03-08 NOTE — HISTORY OF PRESENT ILLNESS
[FreeTextEntry1] : anemia. atrial fibrillation remote pacemaker.  hasn’t required a transfusion in a while (3/16)..\par has been a little more dyspneic but not using inhaler as prescribed.\par says some mornings she wakes up with a rapid heart beat. pacemaker changed and winthrop and monitored at [facility].\par \par no acute problems at this time.  chronic anemia requires periodic blood transfusions.\par \par 6/5  no active complaints.. required another transfusions in March..\par \par 12/4/2017  daughter states much better since not taking aspirin.  last transfusion is 7/7/2017.  most recent hgb 11.9 in november. daughters only concern is confusion.\par \par \par 8/7  continues to require transfusions.  two recently.  although patient has not shown evidence of bleeding the patient and daughter do not feel confident taking the aspirin so she stopped a month ago.  she says she feels better since then.  a bone marrow bx is being considered.\par \par 4/18/2018 no recent transfusions since aspirin was stopped.  no dyspnea.  she stopped lasix on her own about 1 month ago.  \par blood work  hgb 11.4/35 plt 159, wbc 4.1\par creat 1.0, nml lfts, \par feels better overall.\par \par 9/26/2018  doing very well overall.  has not required further blood transfusions. is using lasix on prn basis.denies dyspnea or edema.\par \par 1/16/2019  had problem with uterine bleed.  no chest pain. dyspnea or edema.  echo reviewed and appears stable.\par \par 4/15/2019  no new issues or complaints.  stopped  using diuretic because of incontinence.\par \par 8/5/2019  pacemaker check was good.  no chest pain,  no water retention.  progressive memory.  eating well.\par \par 12/18/2019  complains of frequent urination.  not on a diuretic. daughter says not diabetic.  no edema. no dyspnea,.\par \par 6/17/2020  no chest pain. dyspnea, min edema..  progressive memory and confusion issues. \par \par 11/06/2020  no chest pain, dyspnea or edema. confusion .  takes zetia, folic acid occasionally.\par \par 3/8/2021  no chest pain, dyspnea or edema, diagnosed with Alzheimers.   has not required any transfusions.

## 2021-04-19 ENCOUNTER — APPOINTMENT (OUTPATIENT)
Dept: CARDIOLOGY | Facility: CLINIC | Age: 86
End: 2021-04-19

## 2021-05-03 ENCOUNTER — APPOINTMENT (OUTPATIENT)
Dept: CARDIOLOGY | Facility: CLINIC | Age: 86
End: 2021-05-03
Payer: MEDICARE

## 2021-05-03 ENCOUNTER — NON-APPOINTMENT (OUTPATIENT)
Age: 86
End: 2021-05-03

## 2021-05-03 VITALS
BODY MASS INDEX: 24.8 KG/M2 | HEIGHT: 67 IN | DIASTOLIC BLOOD PRESSURE: 76 MMHG | OXYGEN SATURATION: 97 % | WEIGHT: 158 LBS | SYSTOLIC BLOOD PRESSURE: 124 MMHG | HEART RATE: 80 BPM

## 2021-05-03 DIAGNOSIS — I48.91 UNSPECIFIED ATRIAL FIBRILLATION: ICD-10-CM

## 2021-05-03 PROCEDURE — 93000 ELECTROCARDIOGRAM COMPLETE: CPT

## 2021-05-03 PROCEDURE — 99214 OFFICE O/P EST MOD 30 MIN: CPT

## 2021-05-03 RX ORDER — FUROSEMIDE 40 MG/1
40 TABLET ORAL DAILY
Qty: 90 | Refills: 1 | Status: ACTIVE | COMMUNITY
Start: 2021-05-03 | End: 1900-01-01

## 2021-05-03 RX ORDER — METOPROLOL TARTRATE 25 MG/1
25 TABLET, FILM COATED ORAL
Qty: 180 | Refills: 2 | Status: DISCONTINUED | COMMUNITY
Start: 2018-09-26 | End: 2021-05-03

## 2021-05-03 RX ORDER — METOPROLOL TARTRATE 25 MG/1
25 TABLET, FILM COATED ORAL
Qty: 180 | Refills: 0 | Status: DISCONTINUED | COMMUNITY
Start: 2020-10-23 | End: 2021-05-03

## 2021-05-03 NOTE — CARDIOLOGY SUMMARY
[de-identified] : Electronic ventricular pacemaker \par Pacemaker ECG, No further analysis \par INSUFFICIENT DATA\par

## 2021-05-03 NOTE — HISTORY OF PRESENT ILLNESS
[FreeTextEntry1] : anemia. atrial fibrillation remote pacemaker.  hasn’t required a transfusion in a while (3/16)..\par has been a little more dyspneic but not using inhaler as prescribed.\par says some mornings she wakes up with a rapid heart beat. pacemaker changed and winthrop and monitored at [facility].\par \par no acute problems at this time.  chronic anemia requires periodic blood transfusions.\par \par 6/5  no active complaints.. required another transfusions in March..\par \par 12/4/2017  daughter states much better since not taking aspirin.  last transfusion is 7/7/2017.  most recent hgb 11.9 in november. daughters only concern is confusion.\par \par \par 8/7  continues to require transfusions.  two recently.  although patient has not shown evidence of bleeding the patient and daughter do not feel confident taking the aspirin so she stopped a month ago.  she says she feels better since then.  a bone marrow bx is being considered.\par \par 4/18/2018 no recent transfusions since aspirin was stopped.  no dyspnea.  she stopped lasix on her own about 1 month ago.  \par blood work  hgb 11.4/35 plt 159, wbc 4.1\par creat 1.0, nml lfts, \par feels better overall.\par \par 9/26/2018  doing very well overall.  has not required further blood transfusions. is using lasix on prn basis.denies dyspnea or edema.\par \par 1/16/2019  had problem with uterine bleed.  no chest pain. dyspnea or edema.  echo reviewed and appears stable.\par \par 4/15/2019  no new issues or complaints.  stopped  using diuretic because of incontinence.\par \par 8/5/2019  pacemaker check was good.  no chest pain,  no water retention.  progressive memory.  eating well.\par \par 12/18/2019  complains of frequent urination.  not on a diuretic. daughter says not diabetic.  no edema. no dyspnea,.\par \par 6/17/2020  no chest pain. dyspnea, min edema..  progressive memory and confusion issues. \par \par 11/06/2020  no chest pain, dyspnea or edema. confusion .  takes zetia, folic acid occasionally.\par \par 3/8/2021  no chest pain, dyspnea or edema, diagnosed with Alzheimers.   has not required any transfusions.\par \par 5/3/2021  progressive dementia.  no transfusions.

## 2021-05-03 NOTE — PHYSICAL EXAM

## 2021-05-03 NOTE — REASON FOR VISIT
[Symptom and Test Evaluation] : symptom and test evaluation [Hyperlipidemia] : hyperlipidemia [Hypertension] : hypertension

## 2021-05-03 NOTE — DISCUSSION/SUMMARY
[Paroxysmal Atrial Fibrillation] : paroxysmal atrial fibrillation [Hyperlipidemia] : hyperlipidemia [Essential Hypertension] : essential hypertension [Stable] : stable [None] : There are no changes in medication management [___ Month(s)] : in [unfilled] month(s) [FreeTextEntry1] : 85 year old with remote mitral surgery, afib, chronic severe anemia requiring transfusion.  has been taken off anticoagulation..  recent ppm replacement at [place].  no active or acute cardio vasc complaints..\par \par 12/4/2017  doing better overall./  mild lower extremity edema.  recommended few dose of low dose diuretic.\par \par 4/18/2018  no active sx.  self stopped lasix.  told her to keep track of edema and use on prn basis.  cannot tolerate aspirin, but no blood transfusions.   states does not need refills.  has maintained nsr.\par \par 9/26/2018  voices no complaints.  no longer transfusion dependant.  not able to tolerate anti coagulation.  BP mildly elevated increase metoprolol to 75 bid.\par \par 1/16/2019  swelling front and side of left shin.calve.  has been that way for a least a month.  obtain Doppler r/o dvtt.  bp still elevated discussed diet and restriction from salt.  echo reviewed.  mild to node TR with mild pulm htn. maintain diuretic,.\par \par 8/5/2019  no acute sx.. bp improved and no peripheral edema..  maintain current med tx.\par \par \par \par 5/3/2021  progressive dementia.  some lower extremity edema.  BP is well controlled.  conservative care given age and multiple co morbidities.

## 2021-06-07 ENCOUNTER — NON-APPOINTMENT (OUTPATIENT)
Age: 86
End: 2021-06-07

## 2021-06-07 ENCOUNTER — APPOINTMENT (OUTPATIENT)
Dept: CARDIOLOGY | Facility: CLINIC | Age: 86
End: 2021-06-07
Payer: MEDICARE

## 2021-06-07 VITALS
WEIGHT: 158 LBS | HEIGHT: 67 IN | BODY MASS INDEX: 24.8 KG/M2 | DIASTOLIC BLOOD PRESSURE: 80 MMHG | SYSTOLIC BLOOD PRESSURE: 138 MMHG | HEART RATE: 79 BPM | OXYGEN SATURATION: 97 %

## 2021-06-07 DIAGNOSIS — I10 ESSENTIAL (PRIMARY) HYPERTENSION: ICD-10-CM

## 2021-06-07 DIAGNOSIS — I36.9 NONRHEUMATIC TRICUSPID VALVE DISORDER, UNSPECIFIED: ICD-10-CM

## 2021-06-07 DIAGNOSIS — R60.9 EDEMA, UNSPECIFIED: ICD-10-CM

## 2021-06-07 PROCEDURE — 93000 ELECTROCARDIOGRAM COMPLETE: CPT

## 2021-06-07 PROCEDURE — 99214 OFFICE O/P EST MOD 30 MIN: CPT

## 2021-06-07 NOTE — HISTORY OF PRESENT ILLNESS
[FreeTextEntry1] : anemia. atrial fibrillation remote pacemaker.  hasn’t required a transfusion in a while (3/16)..\par has been a little more dyspneic but not using inhaler as prescribed.\par says some mornings she wakes up with a rapid heart beat. pacemaker changed and winthrop and monitored at [facility].\par \par no acute problems at this time.  chronic anemia requires periodic blood transfusions.\par \par 6/5  no active complaints.. required another transfusions in March..\par \par 12/4/2017  daughter states much better since not taking aspirin.  last transfusion is 7/7/2017.  most recent hgb 11.9 in november. daughters only concern is confusion.\par \par \par 8/7  continues to require transfusions.  two recently.  although patient has not shown evidence of bleeding the patient and daughter do not feel confident taking the aspirin so she stopped a month ago.  she says she feels better since then.  a bone marrow bx is being considered.\par \par 4/18/2018 no recent transfusions since aspirin was stopped.  no dyspnea.  she stopped lasix on her own about 1 month ago.  \par blood work  hgb 11.4/35 plt 159, wbc 4.1\par creat 1.0, nml lfts, \par feels better overall.\par \par 9/26/2018  doing very well overall.  has not required further blood transfusions. is using lasix on prn basis.denies dyspnea or edema.\par \par 1/16/2019  had problem with uterine bleed.  no chest pain. dyspnea or edema.  echo reviewed and appears stable.\par \par 4/15/2019  no new issues or complaints.  stopped  using diuretic because of incontinence.\par \par 8/5/2019  pacemaker check was good.  no chest pain,  no water retention.  progressive memory.  eating well.\par \par 12/18/2019  complains of frequent urination.  not on a diuretic. daughter says not diabetic.  no edema. no dyspnea,.\par \par 6/17/2020  no chest pain. dyspnea, min edema..  progressive memory and confusion issues. \par \par 11/06/2020  no chest pain, dyspnea or edema. confusion .  takes zetia, folic acid occasionally.\par \par 3/8/2021  no chest pain, dyspnea or edema, diagnosed with Alzheimers.   has not required any transfusions.\par \par 5/3/2021  progressive dementia.  no transfusions.  \par \par 6/7/2021  dementia,  mild lower extremity edema.  no transfusions.

## 2021-06-07 NOTE — DISCUSSION/SUMMARY
[Paroxysmal Atrial Fibrillation] : paroxysmal atrial fibrillation [Right-Sided Heart Failure] : right-sided heart failure [None] : There are no changes in medication management [Essential Hypertension] : essential hypertension [Stable] : stable [FreeTextEntry1] : 85 year old with remote mitral surgery, afib, chronic severe anemia requiring transfusion.  has been taken off anticoagulation..  recent ppm replacement at [place].  no active or acute cardio vasc complaints..\par \par 12/4/2017  doing better overall./  mild lower extremity edema.  recommended few dose of low dose diuretic.\par \par 4/18/2018  no active sx.  self stopped lasix.  told her to keep track of edema and use on prn basis.  cannot tolerate aspirin, but no blood transfusions.   states does not need refills.  has maintained nsr.\par \par 9/26/2018  voices no complaints.  no longer transfusion dependant.  not able to tolerate anti coagulation.  BP mildly elevated increase metoprolol to 75 bid.\par \par 1/16/2019  swelling front and side of left shin.calve.  has been that way for a least a month.  obtain Doppler r/o dvtt.  bp still elevated discussed diet and restriction from salt.  echo reviewed.  mild to node TR with mild pulm htn. maintain diuretic,.\par \par \par 6/7/2021  progressive dementia.  stable right heart faiure with mild to mod edema..  maintain diuretic.  will see if pt qualfies for HHA or home nurse assistance.\par 8/5/2019  no acute sx.. bp improved and no peripheral edema..  maintain current med tx.\par \par

## 2021-06-07 NOTE — CARDIOLOGY SUMMARY
[de-identified] : Electronic ventricular pacemaker \par Pacemaker ECG, No further analysis \par INSUFFICIENT DATA\par

## 2021-07-12 ENCOUNTER — APPOINTMENT (OUTPATIENT)
Dept: CARDIOLOGY | Facility: CLINIC | Age: 86
End: 2021-07-12

## 2021-12-27 ENCOUNTER — RX RENEWAL (OUTPATIENT)
Age: 86
End: 2021-12-27

## 2022-01-07 NOTE — DISCUSSION/SUMMARY
[Persistent Atrial Fibrillation] : persistent atrial fibrillation [Essential Hypertension] : essential hypertension [Stable] : stable [None] : none [Sodium Restriction] : sodium restriction [___ Month(s)] : [unfilled] month(s) [FreeTextEntry1] : 85 year old with remote mitral surgery, afib, chronic severe anemia requiring transfusion.  has been taken off anticoagulation..  recent ppm replacement at [facility].  no active or acute cardio vasc complaints..\par \par 12/4/2017  doing better overall./  mild lower extremity edema.  recommended few dose of low dose diuretic.\par \par 4/18/2018  no active sx.  self stopped lasix.  told her to keep track of edema and use on prn basis.  cannot tolerate aspirin, but no blood transfusions.   states does not need refills.  has maintained nsr.\par \par 9/26/2018  voices no complaints.  no longer transfusion dependant.  not able to tolerate anti coagulation.  BP mildly elevated increase metoprolol to 75 bid.\par \par 1/16/2019  swelling front and side of left shin.calve.  has been that way for a least a month.  obtain Doppler r/o dvtt.  bp still elevated discussed diet and restriction from salt.  echo reviewed.  mild to node TR with mild pulm htn. maintain diuretic,.\par \par 8/5/2019  no acute sx.. bp improved and no peripheral edema..  maintain current med tx.\par \par \par 6/17/2020  persistent afib. cannot tolerate AC.  has not require transfusion in long time.  no sign of heart failure.  slow but progressive memory loss.. spoke with daughter about possible neuro eval.  she will think about it.\par  5

## 2022-05-11 ENCOUNTER — RX RENEWAL (OUTPATIENT)
Age: 87
End: 2022-05-11

## 2022-05-23 ENCOUNTER — APPOINTMENT (OUTPATIENT)
Dept: CARDIOLOGY | Facility: CLINIC | Age: 87
End: 2022-05-23
Payer: MEDICARE

## 2022-05-23 PROCEDURE — 99441: CPT | Mod: 95

## 2022-05-27 NOTE — HISTORY OF PRESENT ILLNESS
[Home] : at home, [unfilled] , at the time of the visit. [Medical Office: ([facility])___] : at the medical office located in  [Family Member] : family member [Verbal consent obtained from patient] : the patient, [unfilled] [FreeTextEntry1] : office visit conducted with patients daughter as patient has dementia.\par \par medications updated\par \par denies new cardiac sx,  weight has been stable,  had fallls and required lengthy hospitalizations which sounded complicated, most likely due to patients dementia.  One hospitatilzation was associated with urine tract infection and resulted in extensive weight loss as patient was not eating.\par \par daughter denied chest pain , dyspnea or edema\par \par pt has regular visiting nurse checkups;\par requested blood work from most recent assessment and to have the visiting nurse send me a copy of their assessment\par \par no med changes\par meds refilled as requested. [Time Spent: ___ minutes] : I have spent [unfilled] minutes with the patient on the telephone

## 2022-08-01 ENCOUNTER — RX RENEWAL (OUTPATIENT)
Age: 87
End: 2022-08-01

## 2023-07-17 ENCOUNTER — RX RENEWAL (OUTPATIENT)
Age: 88
End: 2023-07-17

## 2023-07-20 RX ORDER — METOPROLOL SUCCINATE 50 MG/1
50 TABLET, EXTENDED RELEASE ORAL
Qty: 90 | Refills: 3 | Status: ACTIVE | COMMUNITY
Start: 2021-05-03 | End: 1900-01-01

## 2024-08-23 ENCOUNTER — APPOINTMENT (OUTPATIENT)
Dept: CARDIOLOGY | Facility: CLINIC | Age: 89
End: 2024-08-23